# Patient Record
Sex: FEMALE | Race: WHITE | URBAN - METROPOLITAN AREA
[De-identification: names, ages, dates, MRNs, and addresses within clinical notes are randomized per-mention and may not be internally consistent; named-entity substitution may affect disease eponyms.]

---

## 2018-03-06 ENCOUNTER — POST OP (OUTPATIENT)
Dept: URBAN - METROPOLITAN AREA CLINIC 51 | Facility: CLINIC | Age: 68
End: 2018-03-06
Payer: MEDICARE

## 2018-03-06 DIAGNOSIS — H52.4 PRESBYOPIA: ICD-10-CM

## 2018-03-06 DIAGNOSIS — H04.123 TEAR FILM INSUFFICIENCY OF BILATERAL LACRIMAL GLANDS: Primary | ICD-10-CM

## 2018-03-06 PROCEDURE — 92004 COMPRE OPH EXAM NEW PT 1/>: CPT | Performed by: OPTOMETRIST

## 2018-03-06 PROCEDURE — 92015 DETERMINE REFRACTIVE STATE: CPT | Performed by: OPTOMETRIST

## 2018-03-06 ASSESSMENT — VISUAL ACUITY
OD: 20/20
OS: 20/20

## 2018-03-06 ASSESSMENT — KERATOMETRY
OS: 44.48
OD: 44.04

## 2018-03-06 ASSESSMENT — INTRAOCULAR PRESSURE
OD: 14
OS: 12

## 2019-06-20 ENCOUNTER — FOLLOW UP ESTABLISHED (OUTPATIENT)
Dept: URBAN - METROPOLITAN AREA CLINIC 51 | Facility: CLINIC | Age: 69
End: 2019-06-20
Payer: MEDICARE

## 2019-06-20 DIAGNOSIS — H25.13 AGE-RELATED NUCLEAR CATARACT, BILATERAL: Primary | ICD-10-CM

## 2019-06-20 PROCEDURE — 92015 DETERMINE REFRACTIVE STATE: CPT | Performed by: OPTOMETRIST

## 2019-06-20 PROCEDURE — 92014 COMPRE OPH EXAM EST PT 1/>: CPT | Performed by: OPTOMETRIST

## 2019-06-20 ASSESSMENT — KERATOMETRY
OD: 43.98
OS: 44.59

## 2019-06-20 ASSESSMENT — INTRAOCULAR PRESSURE
OS: 12
OD: 11

## 2019-06-20 ASSESSMENT — VISUAL ACUITY
OS: 20/20
OD: 20/20

## 2020-01-17 ENCOUNTER — FOLLOW UP ESTABLISHED (OUTPATIENT)
Dept: URBAN - METROPOLITAN AREA CLINIC 51 | Facility: CLINIC | Age: 70
End: 2020-01-17
Payer: MEDICARE

## 2020-01-17 DIAGNOSIS — H11.31 SUBCONJUNCTIVAL HEMORRHAGE OF RIGHT EYE: Primary | ICD-10-CM

## 2020-01-17 PROCEDURE — 92012 INTRM OPH EXAM EST PATIENT: CPT | Performed by: OPTOMETRIST

## 2020-01-17 ASSESSMENT — INTRAOCULAR PRESSURE
OD: 17
OS: 17

## 2020-06-30 ENCOUNTER — FOLLOW UP ESTABLISHED (OUTPATIENT)
Dept: URBAN - METROPOLITAN AREA CLINIC 51 | Facility: CLINIC | Age: 70
End: 2020-06-30
Payer: MEDICARE

## 2020-06-30 PROCEDURE — 92134 CPTRZ OPH DX IMG PST SGM RTA: CPT | Performed by: OPTOMETRIST

## 2020-06-30 PROCEDURE — 92015 DETERMINE REFRACTIVE STATE: CPT | Performed by: OPTOMETRIST

## 2020-06-30 PROCEDURE — 92014 COMPRE OPH EXAM EST PT 1/>: CPT | Performed by: OPTOMETRIST

## 2020-06-30 ASSESSMENT — INTRAOCULAR PRESSURE
OS: 16
OD: 16

## 2020-06-30 ASSESSMENT — KERATOMETRY
OD: 44.01
OS: 44.45

## 2020-06-30 ASSESSMENT — VISUAL ACUITY
OS: 20/25
OD: 20/25

## 2021-07-20 ENCOUNTER — OFFICE VISIT (OUTPATIENT)
Dept: URBAN - METROPOLITAN AREA CLINIC 51 | Facility: CLINIC | Age: 71
End: 2021-07-20
Payer: MEDICARE

## 2021-07-20 DIAGNOSIS — H43.813 VITREOUS DEGENERATION, BILATERAL: ICD-10-CM

## 2021-07-20 PROCEDURE — 92014 COMPRE OPH EXAM EST PT 1/>: CPT | Performed by: OPTOMETRIST

## 2021-07-20 PROCEDURE — 92134 CPTRZ OPH DX IMG PST SGM RTA: CPT | Performed by: OPTOMETRIST

## 2021-07-20 ASSESSMENT — VISUAL ACUITY
OD: 20/20
OS: 20/20

## 2021-07-20 ASSESSMENT — INTRAOCULAR PRESSURE
OS: 11
OD: 11

## 2021-07-20 ASSESSMENT — KERATOMETRY
OD: 43.98
OS: 44.33

## 2023-06-15 ENCOUNTER — OFFICE VISIT (OUTPATIENT)
Dept: URBAN - METROPOLITAN AREA CLINIC 51 | Facility: CLINIC | Age: 73
End: 2023-06-15
Payer: MEDICARE

## 2023-06-15 DIAGNOSIS — H43.813 VITREOUS DEGENERATION, BILATERAL: ICD-10-CM

## 2023-06-15 DIAGNOSIS — H25.13 AGE-RELATED NUCLEAR CATARACT, BILATERAL: Primary | ICD-10-CM

## 2023-06-15 DIAGNOSIS — H35.371 PUCKERING OF MACULA, RIGHT EYE: ICD-10-CM

## 2023-06-15 DIAGNOSIS — H52.4 PRESBYOPIA: ICD-10-CM

## 2023-06-15 DIAGNOSIS — H04.123 TEAR FILM INSUFFICIENCY OF BILATERAL LACRIMAL GLANDS: ICD-10-CM

## 2023-06-15 PROCEDURE — 99214 OFFICE O/P EST MOD 30 MIN: CPT | Performed by: OPTOMETRIST

## 2023-06-15 PROCEDURE — 92134 CPTRZ OPH DX IMG PST SGM RTA: CPT | Performed by: OPTOMETRIST

## 2023-06-15 ASSESSMENT — VISUAL ACUITY
OD: 20/20
OS: 20/25

## 2023-06-15 ASSESSMENT — INTRAOCULAR PRESSURE
OS: 12
OD: 12

## 2023-06-15 ASSESSMENT — KERATOMETRY
OS: 7.59
OD: 7.67

## 2023-06-15 NOTE — IMPRESSION/PLAN
Impression: Tear film insufficiency of bilateral lacrimal glands: H04.123.
*Uses Retaine but ran out. Plan: Recommend patient to use ATs QID or more OU (from a list of recommended brands of artificial tears, dispensed list for patient to review), preferably preservative free ATs and keeping OU closed directly after instillation for a minimum of 30 seconds.

## 2023-06-15 NOTE — IMPRESSION/PLAN
Impression: Puckering of macula, right eye: H35.371. *ERM with perimacular Plan: Discussed findings with patient. OCT Macular Scan completed, reviewed and documented. Check annually or sooner if patient notices distortions or if there is a decline in vision.

## 2023-06-15 NOTE — IMPRESSION/PLAN
Impression: Age-related nuclear cataract, bilateral: H25.13. Cataract causing symptomatic impairment of visual function not correctable with a tolerable change in glasses or contact lenses resulting in the patient's inability to function satisfactorily while performing Activities of Daily Life including, but not limited to reading, viewing television, driving, or meeting vocational or recreational needs. Plan:  Discussed cataracts with patient. Discussed treatment options. Surgical treatment is recommended. Surgical risks and benefits discussed. Patient elects surgical treatment. Recommend surgery OU, OS first. Aim OD: plano. Aim OS: plano. Patient will need glasses for all near work, including computer. 
Patient understands she is not an candidate for LASIK